# Patient Record
Sex: FEMALE | Race: BLACK OR AFRICAN AMERICAN | URBAN - METROPOLITAN AREA
[De-identification: names, ages, dates, MRNs, and addresses within clinical notes are randomized per-mention and may not be internally consistent; named-entity substitution may affect disease eponyms.]

---

## 2017-07-03 ENCOUNTER — OFFICE VISIT (OUTPATIENT)
Dept: OBGYN CLINIC | Facility: CLINIC | Age: 37
End: 2017-07-03

## 2017-07-03 VITALS
SYSTOLIC BLOOD PRESSURE: 118 MMHG | HEART RATE: 80 BPM | HEIGHT: 64 IN | BODY MASS INDEX: 30.73 KG/M2 | WEIGHT: 180 LBS | DIASTOLIC BLOOD PRESSURE: 60 MMHG

## 2017-07-03 DIAGNOSIS — Z01.419 WELL WOMAN EXAM WITH ROUTINE GYNECOLOGICAL EXAM: Primary | ICD-10-CM

## 2017-07-03 PROBLEM — N92.6 IRREGULAR MENSES: Status: ACTIVE | Noted: 2017-07-03

## 2017-07-03 PROCEDURE — 99385 PREV VISIT NEW AGE 18-39: CPT | Performed by: OBSTETRICS & GYNECOLOGY

## 2017-07-03 RX ORDER — PNV,CALCIUM 72/IRON/FOLIC ACID 27 MG-1 MG
TABLET ORAL
Refills: 0 | COMMUNITY
Start: 2017-06-19

## 2017-07-03 RX ORDER — NORETHINDRONE ACETATE AND ETHINYL ESTRADIOL AND FERROUS FUMARATE 1MG-20(21)
KIT ORAL
Refills: 2 | COMMUNITY
Start: 2017-06-09 | End: 2017-07-03

## 2017-07-03 RX ORDER — NORETHINDRONE ACETATE AND ETHINYL ESTRADIOL AND FERROUS FUMARATE 1MG-20(21)
KIT ORAL
Qty: 3 PACKAGE | Refills: 4 | Status: SHIPPED | OUTPATIENT
Start: 2017-07-03

## 2017-07-03 NOTE — PROGRESS NOTES
Salome Aldana is a 29year old female  Patient's last menstrual period was 2017 (approximate). Patient presents with:  OCP: bc concerns. New pt. Discuss irregular periods x 4 months.    .     Patient has been on the pill for 3 or 4 months sh 2  •  Prenatal Vit-Fe Fumarate-FA (PREPLUS) 27-1 MG Oral Tab, TK 1 T PO  QD, Disp: , Rfl: 0    ALLERGIES:  Not on File      Review of Systems:  Constitutional:  Denies fatigue, night sweats, hot flashes  Gastrointestinal:  denies heartburn, abdominal pain,

## 2017-09-25 ENCOUNTER — PATIENT MESSAGE (OUTPATIENT)
Dept: OBGYN CLINIC | Facility: CLINIC | Age: 37
End: 2017-09-25

## 2017-09-25 ENCOUNTER — TELEPHONE (OUTPATIENT)
Dept: OBGYN CLINIC | Facility: CLINIC | Age: 37
End: 2017-09-25

## 2017-09-25 NOTE — TELEPHONE ENCOUNTER
Spoke w/pt re: bleeding. Has been taking same pill as directed since 7/2016. Has been bleeding for 8 days, changing pads every 3 hrs.   Explained perhaps her ocp needs to be changed to a stronger one, or could have a buildup in the lining d/t short cycles

## 2017-09-25 NOTE — TELEPHONE ENCOUNTER
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 I have been on the pill and last week, I started my menstrual cycle on Sunday.  I was on week before the placebo pills

## 2017-09-25 NOTE — TELEPHONE ENCOUNTER
pt has been bleeding for 8 days and usually only bleeds for 4 pt is also on bc pill and not sure what to do

## 2018-08-20 RX ORDER — NORETHINDRONE ACETATE AND ETHINYL ESTRADIOL AND FERROUS FUMARATE 1MG-20(21)
KIT ORAL
Qty: 84 TABLET | Refills: 0 | OUTPATIENT
Start: 2018-08-20